# Patient Record
Sex: MALE | Race: OTHER | NOT HISPANIC OR LATINO | Employment: OTHER | URBAN - METROPOLITAN AREA
[De-identification: names, ages, dates, MRNs, and addresses within clinical notes are randomized per-mention and may not be internally consistent; named-entity substitution may affect disease eponyms.]

---

## 2018-08-10 ENCOUNTER — OFFICE VISIT (OUTPATIENT)
Dept: URGENT CARE | Facility: MEDICAL CENTER | Age: 33
End: 2018-08-10
Payer: COMMERCIAL

## 2018-08-10 VITALS
BODY MASS INDEX: 22.28 KG/M2 | HEART RATE: 66 BPM | HEIGHT: 68 IN | TEMPERATURE: 96.6 F | SYSTOLIC BLOOD PRESSURE: 133 MMHG | WEIGHT: 147 LBS | RESPIRATION RATE: 16 BRPM | DIASTOLIC BLOOD PRESSURE: 86 MMHG | OXYGEN SATURATION: 97 %

## 2018-08-10 DIAGNOSIS — L24.7 IRRITANT CONTACT DERMATITIS DUE TO PLANTS, EXCEPT FOOD: Primary | ICD-10-CM

## 2018-08-10 PROCEDURE — G0381 LEV 2 HOSP TYPE B ED VISIT: HCPCS | Performed by: PHYSICIAN ASSISTANT

## 2018-08-10 PROCEDURE — S9083 URGENT CARE CENTER GLOBAL: HCPCS | Performed by: PHYSICIAN ASSISTANT

## 2018-08-10 RX ORDER — PREDNISONE 10 MG/1
TABLET ORAL
Qty: 18 TABLET | Refills: 0 | Status: SHIPPED | OUTPATIENT
Start: 2018-08-10

## 2018-08-10 NOTE — PROGRESS NOTES
[facility]        NAME: Taina Nye is a 29 y o  male  : 1985    MRN: [id number]  DATE: August 10, 2018  TIME: 8:02 PM    Assessment and Plan   Irritant contact dermatitis due to plants, except food [L24 7]  1  Irritant contact dermatitis due to plants, except food  predniSONE 10 mg tablet         Patient Instructions       Follow up with PCP in 3-5 days  Wash hat shoes and fingernails    Chief Complaint     Chief Complaint   Patient presents with    Rash     Patient here with a rash for the past 4 days  He has been using Calamine lotion to the areas  History of Present Illness       Rash   This is a new problem  The current episode started in the past 7 days  The problem has been gradually worsening since onset  The rash is diffuse  The rash is characterized by blistering and redness  He was exposed to plant contact  Pertinent negatives include no anorexia, congestion, cough, diarrhea, eye pain, facial edema, fatigue, fever, joint pain, nail changes, rhinorrhea, shortness of breath, sore throat or vomiting  Past treatments include anti-itch cream  The treatment provided no relief  Review of Systems   Review of Systems   Constitutional: Negative for fatigue and fever  HENT: Negative for congestion, rhinorrhea and sore throat  Eyes: Negative for pain  Respiratory: Negative for cough and shortness of breath  Gastrointestinal: Negative for anorexia, diarrhea and vomiting  Musculoskeletal: Negative for joint pain  Skin: Positive for rash  Negative for nail changes  All other systems reviewed and are negative          Current Medications       Current Outpatient Prescriptions:     predniSONE 10 mg tablet, 4 x 3 days, 3 x 1, 2 x 1, 1 x 1, Disp: 18 tablet, Rfl: 0    Current Allergies     Allergies as of 08/10/2018    (No Known Allergies)            The following portions of the patient's history were reviewed and updated as appropriate: allergies, current medications, past family history, past medical history, past social history, past surgical history and problem list      No past medical history on file  No past surgical history on file  No family history on file  Medications have been verified  Objective   /86   Pulse 66   Temp (!) 96 6 °F (35 9 °C) (Tympanic)   Resp 16   Ht 5' 8" (1 727 m)   Wt 66 7 kg (147 lb)   SpO2 97%   BMI 22 35 kg/m²        Physical Exam     Physical Exam   Constitutional: He appears well-developed and well-nourished  Cardiovascular: Normal rate and regular rhythm  Pulmonary/Chest: Effort normal    Nursing note and vitals reviewed        Macular vesicular rash arms legs chest neck

## 2018-08-11 NOTE — PATIENT INSTRUCTIONS
Poison Ivy   WHAT YOU NEED TO KNOW:   Poison ivy is a plant that can cause an itchy, uncomfortable rash on your skin  Poison ivy grows as a shrub or vine in woods, fields, and areas of thick [place]  It has 3 bright green leaves on each stem that turn red in ginger  DISCHARGE INSTRUCTIONS:   Medicines:   · Antiseptic or drying creams or ointments: These medicines may be used to dry out the rash and decrease the itching  These products may be available without a doctor's order  · Steroids: This medicine helps decrease itching and inflammation  It can be given as a cream to apply to your skin or as a pill  · Antihistamines: This medicine may help decrease itching and help you sleep  It is available without a doctor's order  · Take your medicine as directed  Contact your healthcare provider if you think your medicine is not helping or if you have side effects  Tell him of her if you are allergic to any medicine  Keep a list of the medicines, vitamins, and herbs you take  Include the amounts, and when and why you take them  Bring the list or the pill bottles to follow-up visits  Carry your medicine list with you in case of an emergency  Follow up with your healthcare provider as directed:  Write down your questions so you remember to ask them during your visits  How your poison ivy rash spreads: You cannot spread poison ivy by touching your rash or the liquid from your blisters  Poison ivy is spread only if you scratch your skin while it still has oil on it  You may think your rash is spreading because new rashes appear over a number of days  This happens because areas covered by thin skin break out in a rash first  Your face or forearms may develop a rash before thicker areas, such as the palms of your hands  Self-care:   · Keep your rash clean and dry:  Wash it with soap and water  Gently pat it dry with a clean towel  · Try not to scratch or rub your rash:   This can cause your skin to become infected  · Use a compress on your rash:  Dip a clean washcloth in cool water  Wring it out and place it on your rash  Leave the washcloth on your skin for 15 minutes  Do this at least 3 times per day  · Take a cornstarch or oatmeal bath: If your rash is too large to cover with wet washcloths, take 3 or 4 cornstarch baths daily  Mix 1 pound of cornstarch with a little water to make a paste  Add the paste to a tub full of water and mix well  You may also use colloidal oatmeal in the bath water  Use lukewarm water  Avoid hot water because it may cause your itching to increase  Prevent a poison ivy rash in the future:   · Wear skin protection:  Wear long pants, a long-sleeved shirt, and gloves  Use a skin block lotion to protect your skin from poison ivy oil  You can find this at a drugstore without a prescription  · Wash clothing after possible exposure: If you think you have been near a poison ivy plant, wash the clothes you were wearing separately from other clothes  Rinse the washing machine well after you take the clothes out  Scrub boots and shoes with warm, soapy water  Dry clean items and clothing that you cannot wash in water  Poison ivy oil is sticky and can stay on surfaces for a long time  It can cause a new rash even years later  · Bathe your pet:  Use warm water and shampoo on your pet's fur  This will prevent the spread of oil to your skin, car, and home  Wear long sleeves, long pants, and gloves while washing pets or any items that may have oil on them  · Reduce exposure to poison ivy:  Do not touch plants that look like poison ivy  Keep your yard free of poison ivy  While protecting your skin, remove the plant and the roots  Place them in a plastic bag and seal the bag tightly  · Do not burn poison ivy plants: This can spread the oil through the air  If you breathe the oil into your lungs, you could have swelling and serious breathing problems   Oil that clings to the fire mally can land on your skin and cause a rash  Contact your healthcare provider if:   · You have pus, soft yellow scabs, or tenderness on the rash  · The itching gets worse or keeps you awake at night  · The rash covers more than 1/4 of your skin or spreads to your eyes, mouth, or genital area  · The rash is not better after 2 to 3 weeks  · You have tender, swollen glands on the sides of your neck  · You have swelling in your arms and legs  · You have questions or concerns about your condition or care  Return to the emergency department if:   · You have a fever  · You have redness, swelling, and tenderness around the rash  · You have trouble breathing  © 2017 [facility] Information is for End User's use only and may not be sold, redistributed or otherwise used for commercial purposes  All illustrations and images included in CareNotes® are the copyrighted property of [employer]  or [employer]  The above information is an  only  It is not intended as medical advice for individual conditions or treatments  Talk to your doctor, nurse or pharmacist before following any medical regimen to see if it is safe and effective for you